# Patient Record
Sex: FEMALE | Race: WHITE
[De-identification: names, ages, dates, MRNs, and addresses within clinical notes are randomized per-mention and may not be internally consistent; named-entity substitution may affect disease eponyms.]

---

## 2018-06-13 ENCOUNTER — HOSPITAL ENCOUNTER (OUTPATIENT)
Dept: HOSPITAL 11 - JP.SDS | Age: 78
Discharge: HOME | End: 2018-06-13
Attending: ORTHOPAEDIC SURGERY
Payer: MEDICARE

## 2018-06-13 VITALS — DIASTOLIC BLOOD PRESSURE: 74 MMHG | SYSTOLIC BLOOD PRESSURE: 130 MMHG

## 2018-06-13 DIAGNOSIS — G56.01: Primary | ICD-10-CM

## 2018-06-13 DIAGNOSIS — E78.00: ICD-10-CM

## 2018-06-13 DIAGNOSIS — I10: ICD-10-CM

## 2018-06-13 DIAGNOSIS — Z79.82: ICD-10-CM

## 2018-06-13 DIAGNOSIS — Z79.899: ICD-10-CM

## 2018-06-13 DIAGNOSIS — Z88.8: ICD-10-CM

## 2018-06-13 DIAGNOSIS — Z88.6: ICD-10-CM

## 2018-06-13 PROCEDURE — 64721 CARPAL TUNNEL SURGERY: CPT

## 2018-06-13 NOTE — OR
DATE OF PROCEDURE:  06/13/2018

 

PREOPERATIVE DIAGNOSIS:  Right carpal tunnel syndrome.

 

POSTOPERATIVE DIAGNOSIS:  Right carpal tunnel syndrome.

 

PROCEDURE:  Right carpal tunnel release.

 

ANESTHESIA:  [place] block plus conscious sedation.

 

FLUIDS:  Lactated Ringer solution.

 

ESTIMATED BLOOD LOSS:  Less than 5 mL.

 

COMPLICATIONS:  None.

 

SPECIMEN:  None.

 

DISCHARGE DISPOSITION:  Stable to PACU.

 

INDICATIONS FOR THE PROCEDURE:  The patient was seen preoperatively in the clinic.  She had

an EMG showing moderate compression of the median nerve at the wrist.  She had associated

symptoms with carpal tunnel syndrome.  Risks and benefits of the procedure were explained to

the patient and informed consent was obtained.

 

DESCRIPTION OF PROCEDURE:  The patient was seen preoperatively by myself, and the Anesthesia

staff in the preoperative holding area, where the operative site was marked.  She was

brought to the operative suite by Anesthesia staff, where a [place] block and conscious

sedation were performed.  The Cherry block was performed on the right upper extremity.  The

right upper extremity was then prepped and draped in a sterile manner.  Time-out was called

identifying the correct patient, the correct procedure, the correct site, and that

antibiotics had been begun within the appropriate period of time.  I did use a lead-hand for

positioning and a blue towel.  All extremities were found to be well padded.  I then made an

incision proximal to Michaud's cardinal line, in line with the first ray, and then in line

with the radial border of the fourth digit, extending approximately 1.5 cm proximally.  I

did remove some subcutaneous fat.  I did use a self-retaining retractor for retraction.  I

then identified the transverse carpal ligament and then cut this initially with a 15 blade

and then finished the cut with iris scissors.  I then used a Ragnell for retraction and went

underneath the deep palmar fascia and above it, both proximally and distally to the

transverse carpal ligament and then under direct visualization,

divided it using the iris scissors.  After this was performed, we then copiously irrigated

with saline and then placed a few drops of dexamethasone over the median nerve and then

closed with 2 horizontal mattress sutures that were 3-0 nylon.

A Betadine-soaked Adaptic was then placed plus sterile dressing.  The patient was kept until

20 minutes before the [place] block was let down as to avoid any cardiac complications.  The

patient was then taken to the PACU in a stable condition.

 

 

 

 

Wilman Pitts DO

DD:  06/13/2018 08:01:45

DT:  06/13/2018 08:37:33

Job #:  [id number]